# Patient Record
Sex: FEMALE | NOT HISPANIC OR LATINO | URBAN - METROPOLITAN AREA
[De-identification: names, ages, dates, MRNs, and addresses within clinical notes are randomized per-mention and may not be internally consistent; named-entity substitution may affect disease eponyms.]

---

## 2021-10-18 ENCOUNTER — IMPORTED ENCOUNTER (OUTPATIENT)
Dept: URBAN - METROPOLITAN AREA CLINIC 1 | Facility: CLINIC | Age: 36
End: 2021-10-18

## 2021-10-18 PROBLEM — H52.13: Noted: 2021-10-18

## 2021-10-18 PROBLEM — H52.223: Noted: 2021-10-18

## 2021-10-18 PROCEDURE — S0620 ROUTINE OPHTHALMOLOGICAL EXA: HCPCS

## 2021-10-18 NOTE — PATIENT DISCUSSION
1. Myopia with Astigmatism OU -- Rx was given for correction if indicated and requested. Return for an appointment in 1 year 36 with Dr. Regina Workman.

## 2022-04-02 ASSESSMENT — VISUAL ACUITY
OS_CC: 20/30
OD_CC: 20/30
OD_SC: J1+
OS_SC: J1+

## 2022-04-02 ASSESSMENT — TONOMETRY
OD_IOP_MMHG: 13
OS_IOP_MMHG: 12

## 2022-10-19 ENCOUNTER — ESTABLISHED PATIENT (OUTPATIENT)
Dept: URBAN - METROPOLITAN AREA CLINIC 1 | Facility: CLINIC | Age: 37
End: 2022-10-19

## 2022-10-19 DIAGNOSIS — H52.13: ICD-10-CM

## 2022-10-19 PROCEDURE — 92014 COMPRE OPH EXAM EST PT 1/>: CPT

## 2022-10-19 ASSESSMENT — TONOMETRY
OD_IOP_MMHG: 12
OS_IOP_MMHG: 12

## 2022-10-19 ASSESSMENT — VISUAL ACUITY
OD_CC: 20/20
OS_CC: J1+
OS_CC: 20/20
OD_CC: J1+

## 2022-10-19 NOTE — PATIENT DISCUSSION
Recommend OTC Pataday QD OU, PRN itching. Condition discussed with patient. Avoidance of allergens recommended. Coupon for [employer] given.

## 2023-10-23 ENCOUNTER — COMPREHENSIVE EXAM (OUTPATIENT)
Dept: URBAN - METROPOLITAN AREA CLINIC 1 | Facility: CLINIC | Age: 38
End: 2023-10-23

## 2023-10-23 DIAGNOSIS — H52.13: ICD-10-CM

## 2023-10-23 DIAGNOSIS — Z01.00: ICD-10-CM

## 2023-10-23 DIAGNOSIS — H52.223: ICD-10-CM

## 2023-10-23 PROCEDURE — 92014 COMPRE OPH EXAM EST PT 1/>: CPT

## 2023-10-23 PROCEDURE — 92015 DETERMINE REFRACTIVE STATE: CPT

## 2023-10-23 ASSESSMENT — TONOMETRY
OS_IOP_MMHG: 13
OD_IOP_MMHG: 13

## 2023-10-23 ASSESSMENT — VISUAL ACUITY
OD_CC: J1+
OD_CC: 20/20
OS_CC: 20/20
OS_CC: J1+

## 2024-10-23 ENCOUNTER — COMPREHENSIVE EXAM (OUTPATIENT)
Dept: URBAN - METROPOLITAN AREA CLINIC 1 | Facility: CLINIC | Age: 39
End: 2024-10-23

## 2024-10-23 DIAGNOSIS — Z01.00: ICD-10-CM

## 2024-10-23 DIAGNOSIS — H52.223: ICD-10-CM

## 2024-10-23 DIAGNOSIS — H52.13: ICD-10-CM

## 2024-10-23 PROCEDURE — 92015 DETERMINE REFRACTIVE STATE: CPT

## 2024-10-23 PROCEDURE — 92014 COMPRE OPH EXAM EST PT 1/>: CPT
